# Patient Record
Sex: FEMALE | Race: WHITE | NOT HISPANIC OR LATINO | Employment: FULL TIME | URBAN - METROPOLITAN AREA
[De-identification: names, ages, dates, MRNs, and addresses within clinical notes are randomized per-mention and may not be internally consistent; named-entity substitution may affect disease eponyms.]

---

## 2020-04-11 ENCOUNTER — APPOINTMENT (OUTPATIENT)
Dept: RADIOLOGY | Facility: MEDICAL CENTER | Age: 50
End: 2020-04-11
Attending: EMERGENCY MEDICINE
Payer: COMMERCIAL

## 2020-04-11 ENCOUNTER — HOSPITAL ENCOUNTER (EMERGENCY)
Facility: MEDICAL CENTER | Age: 50
End: 2020-04-11
Attending: EMERGENCY MEDICINE
Payer: COMMERCIAL

## 2020-04-11 VITALS
RESPIRATION RATE: 18 BRPM | WEIGHT: 195 LBS | OXYGEN SATURATION: 93 % | BODY MASS INDEX: 33.29 KG/M2 | SYSTOLIC BLOOD PRESSURE: 120 MMHG | HEART RATE: 93 BPM | HEIGHT: 64 IN | DIASTOLIC BLOOD PRESSURE: 62 MMHG

## 2020-04-11 DIAGNOSIS — S50.01XA CONTUSION OF RIGHT ELBOW, INITIAL ENCOUNTER: ICD-10-CM

## 2020-04-11 DIAGNOSIS — S80.211A ABRASION OF RIGHT KNEE, INITIAL ENCOUNTER: ICD-10-CM

## 2020-04-11 DIAGNOSIS — S20.211A CONTUSION OF RIGHT CHEST WALL, INITIAL ENCOUNTER: ICD-10-CM

## 2020-04-11 DIAGNOSIS — S86.912A KNEE STRAIN, LEFT, INITIAL ENCOUNTER: ICD-10-CM

## 2020-04-11 PROCEDURE — 73560 X-RAY EXAM OF KNEE 1 OR 2: CPT | Mod: LT

## 2020-04-11 PROCEDURE — 90471 IMMUNIZATION ADMIN: CPT

## 2020-04-11 PROCEDURE — 700111 HCHG RX REV CODE 636 W/ 250 OVERRIDE (IP): Performed by: EMERGENCY MEDICINE

## 2020-04-11 PROCEDURE — 305948 HCHG GREEN TRAUMA ACT PRE-NOTIFY NO CC

## 2020-04-11 PROCEDURE — 70450 CT HEAD/BRAIN W/O DYE: CPT

## 2020-04-11 PROCEDURE — 72170 X-RAY EXAM OF PELVIS: CPT

## 2020-04-11 PROCEDURE — 72131 CT LUMBAR SPINE W/O DYE: CPT

## 2020-04-11 PROCEDURE — 90715 TDAP VACCINE 7 YRS/> IM: CPT | Performed by: EMERGENCY MEDICINE

## 2020-04-11 PROCEDURE — 99285 EMERGENCY DEPT VISIT HI MDM: CPT

## 2020-04-11 PROCEDURE — 72128 CT CHEST SPINE W/O DYE: CPT

## 2020-04-11 PROCEDURE — 74177 CT ABD & PELVIS W/CONTRAST: CPT

## 2020-04-11 PROCEDURE — 700117 HCHG RX CONTRAST REV CODE 255: Performed by: EMERGENCY MEDICINE

## 2020-04-11 PROCEDURE — 72125 CT NECK SPINE W/O DYE: CPT

## 2020-04-11 PROCEDURE — 71045 X-RAY EXAM CHEST 1 VIEW: CPT

## 2020-04-11 RX ADMIN — CLOSTRIDIUM TETANI TOXOID ANTIGEN (FORMALDEHYDE INACTIVATED), CORYNEBACTERIUM DIPHTHERIAE TOXOID ANTIGEN (FORMALDEHYDE INACTIVATED), BORDETELLA PERTUSSIS TOXOID ANTIGEN (GLUTARALDEHYDE INACTIVATED), BORDETELLA PERTUSSIS FILAMENTOUS HEMAGGLUTININ ANTIGEN (FORMALDEHYDE INACTIVATED), BORDETELLA PERTUSSIS PERTACTIN ANTIGEN, AND BORDETELLA PERTUSSIS FIMBRIAE 2/3 ANTIGEN 0.5 ML: 5; 2; 2.5; 5; 3; 5 INJECTION, SUSPENSION INTRAMUSCULAR at 19:46

## 2020-04-11 RX ADMIN — IOHEXOL 100 ML: 350 INJECTION, SOLUTION INTRAVENOUS at 18:32

## 2020-04-12 NOTE — ED NOTES
Pt riding horse, no helmet. Was bucked off, got back on and bucked off again with injury. -LOC. GCS 15.  PTA.

## 2020-04-12 NOTE — ED PROVIDER NOTES
"ED Provider Note    Scribed for Derrek Elias M.D. by Cody Fontanez. 4/11/2020, 6:22 PM.    Primary care provider: None noted  Means of arrival: Med Flight  History obtained from: Patient  History limited by: None    CHIEF COMPLAINT  Chief Complaint   Patient presents with   • Trauma Green     Pt BIB EMS flight, pt was riding horse, bucked off, got back on and was bucked off again with injury. -helmet, -LOC,  PTA.    • Chest Pain   • Back Pain   • Nausea       HPI  Darshan Rodas is a 89 y.o. adult who presents to the Emergency Department as a trauma green after being bucked off a horse two times. Patient states that they were bucked off once then proceeded to get back on the horse after which she was bucked off again. Patient states that she experienced right sided chest pain, left knee pain, back pain, shortness of breath and mild nausea. Patient adds that her back pain begins to radiate with palpation to the right chest. En route to the hospital, patient was given two doses of fentanyl and a dose of Zofran. Patient had a GCS of 15 en route to the hospital. Patient states that she wasn't wearing a helmet and denies any loss of consciousness. Patient denies any hip pain or recent cough, cold or congestion.    REVIEW OF SYSTEMS  See HPI for further details. All other systems are negative.     PAST MEDICAL HISTORY  None noted    SURGICAL HISTORY  patient denies any surgical history    SOCIAL HISTORY  Social History     Tobacco Use   • Smoking status: None noted   Substance Use Topics   • Alcohol use: None noted   • Drug use: None noted      Social History     Substance and Sexual Activity   Drug Use None noted       FAMILY HISTORY  None noted    CURRENT MEDICATIONS  Reviewed.  See Encounter Summary.     ALLERGIES  None noted    PHYSICAL EXAM  VITAL SIGNS: /72   Pulse 99   Resp 18   Ht 1.626 m (5' 4\")   Wt 88.5 kg (195 lb)   SpO2 100%   BMI 33.47 kg/m²   Constitutional: Well developed, Well " nourished, minimal distress. Full spine precautions.  HENT: Normocephalic, Atraumatic, Oropharynx moist, no oral trauma. TMs clear, no septal hematoma  Eyes: PERRL, EOMI, Conjunctiva normal, No discharge.  Neck:  Patient is in cervical collar, trachea midline, no vertebral point tenderness  Cardiovascular: Normal heart rate, Normal rhythm, No murmurs, equal pulses.   Pulmonary: Normal breath sounds, No respiratory distress, No wheezing,  rales or rhonchi  Chest: Tenderness to right lateral and posterior chest wall, no deformity.   Abdomen:  Soft, No tenderness, no rebound, no guarding.   Back: No vertebral point tenderness, No step-offs No CVA tenderness.   Musculoskeletal: Tenderness to left knee, Pelvis stable, Good range of motion in all major joints. No major deformities noted.   Skin: Warm, Dry, No rash. Small abrasion on right lateral knee, small abrasion to right elbow.  Neurologic: Alert & oriented x 3, Normal motor function,  No focal deficits noted.   Psychiatric: Affect normal, Judgment normal, Mood normal.    DIAGNOSTIC STUDIES / PROCEDURES     RADIOLOGY  CT-CHEST,ABDOMEN,PELVIS WITH   Final Result      No posttraumatic abnormality identified in the chest, abdomen, or pelvis.      CT-TSPINE W/O PLUS RECONS   Final Result         Negative CT scan of the thoracic spine without contrast.      CT-LSPINE W/O PLUS RECONS   Final Result         Negative CT scan of the lumbar spine without contrast.      CT-HEAD W/O   Final Result      No evidence of acute intracranial process.      CT-CSPINE WITHOUT PLUS RECONS   Final Result         Negative CT scan of the cervical spine.  No fracture or subluxation.      DX-KNEE 2- LEFT   Final Result      Negative limited LEFT knee series.      DX-CHEST-LIMITED (1 VIEW)   Final Result      No acute cardiopulmonary disease.      DX-PELVIS-1 OR 2 VIEWS   Final Result      Negative AP view of the pelvis.        The radiologist's interpretation of all radiological studies have  been reviewed by me.    COURSE & MEDICAL DECISION MAKING  Pertinent Labs & Imaging studies reviewed. (See chart for details)      6:22 PM - Patient seen and examined at bedside. Ordered DX-knee, CT-head, CT-Cspine, CT-chest, abdomen, pelvis, CT-Tspine, CT-Lspine, DX-chest and DX-pelvis to evaluate Darshan Rodas's symptoms.     7:07 PM - Patient was reevaluated at bedside. Discussed radiology results with the patient. I informed the patient of the plan of discharge. Patient will be treated with Adacel injection 0.5 mL. Patient was told to medicate with Tylenol and Ibuprofen for her symptoms. Patient was also informed to apply ice to areas of injury and refrain from being sedentary after discharge. Patient verbalized her understanding and agreement with the plan of discharge.     Decision Making:  Middle-aged female patient presented to the emerge department as trauma green after falling from horse twice.  She notes that she was bucked off at each episode.  No helmet.  No loss of conscious.  Right-sided discomfort as noted.  Fortunately trauma evaluation today is benign with no acute abnormalities.  Likely muscle strain and diffuse contusions to the areas noted.  She will be discharged home at this point with outpatient follow-up as referred with any change or worsening coming back here.    The patient will return for new or worsening symptoms and is stable at the time of discharge.    The patient is referred to a primary physician for blood pressure management, diabetic screening, and for all other preventative health concerns.    DISPOSITION:  Patient will be discharged home in stable condition.    FOLLOW UP:  [facility], Emergency Dept  [facility] [facility] [address] [phone]  Go to   As needed, If symptoms worsen      OUTPATIENT MEDICATIONS:  There are no discharge medications for this patient.        FINAL IMPRESSION  1. Contusion of right chest wall, initial encounter    2. Knee  strain, left, initial encounter    3. Abrasion of right knee, initial encounter    4. Contusion of right elbow, initial encounter          I, Cody Fontanez ([place]), am scribing for, and in the presence of, Derrek Elias M.D..    Electronically signed by: Cody Fontanez ([place]), 4/11/2020    IDerrek M.D. personally performed the services described in this documentation, as scribed by Cody Fontanez in my presence, and it is both accurate and complete. C    The note accurately reflects work and decisions made by me.  Derrek Elias M.D.  4/12/2020  12:37 AM

## 2020-04-12 NOTE — ED TRIAGE NOTES
Chief Complaint   Patient presents with   • Trauma Green     Pt BIB EMS flight, pt was riding horse, bucked off, got back on and was bucked off again with injury. -helmet, -LOC,  PTA.    • Chest Pain   • Back Pain   • Nausea     Pt denies fever/travel or being in contact with anyone testing positive for COVID/[place].  Pt/staff masked and in appropriate PPE during encounter.